# Patient Record
Sex: MALE | Race: WHITE | NOT HISPANIC OR LATINO | Employment: FULL TIME | URBAN - METROPOLITAN AREA
[De-identification: names, ages, dates, MRNs, and addresses within clinical notes are randomized per-mention and may not be internally consistent; named-entity substitution may affect disease eponyms.]

---

## 2020-11-04 ENCOUNTER — LAB (OUTPATIENT)
Dept: LAB | Facility: HOSPITAL | Age: 25
End: 2020-11-04

## 2020-11-04 ENCOUNTER — OFFICE VISIT (OUTPATIENT)
Dept: ENDOCRINOLOGY | Facility: CLINIC | Age: 25
End: 2020-11-04

## 2020-11-04 VITALS
HEART RATE: 65 BPM | BODY MASS INDEX: 27.86 KG/M2 | DIASTOLIC BLOOD PRESSURE: 72 MMHG | WEIGHT: 183.8 LBS | HEIGHT: 68 IN | OXYGEN SATURATION: 99 % | SYSTOLIC BLOOD PRESSURE: 110 MMHG

## 2020-11-04 DIAGNOSIS — E03.9 HYPOTHYROIDISM, UNSPECIFIED TYPE: Primary | ICD-10-CM

## 2020-11-04 DIAGNOSIS — E04.2 NONTOXIC MULTINODULAR GOITER: ICD-10-CM

## 2020-11-04 DIAGNOSIS — R79.89 LOW TESTOSTERONE: ICD-10-CM

## 2020-11-04 PROCEDURE — 84439 ASSAY OF FREE THYROXINE: CPT | Performed by: INTERNAL MEDICINE

## 2020-11-04 PROCEDURE — 99244 OFF/OP CNSLTJ NEW/EST MOD 40: CPT | Performed by: INTERNAL MEDICINE

## 2020-11-04 PROCEDURE — 84443 ASSAY THYROID STIM HORMONE: CPT | Performed by: INTERNAL MEDICINE

## 2020-11-04 PROCEDURE — 84403 ASSAY OF TOTAL TESTOSTERONE: CPT | Performed by: INTERNAL MEDICINE

## 2020-11-04 PROCEDURE — 84402 ASSAY OF FREE TESTOSTERONE: CPT | Performed by: INTERNAL MEDICINE

## 2020-11-04 RX ORDER — TESTOSTERONE CYPIONATE 200 MG/ML
175 INJECTION, SOLUTION INTRAMUSCULAR WEEKLY
COMMUNITY
Start: 2020-10-15 | End: 2021-02-08

## 2020-11-04 RX ORDER — DIVALPROEX SODIUM 125 MG/1
1 TABLET, DELAYED RELEASE ORAL 2 TIMES DAILY
COMMUNITY
Start: 2020-09-30 | End: 2021-02-08

## 2020-11-04 RX ORDER — LEVOTHYROXINE SODIUM 0.12 MG/1
1 TABLET ORAL DAILY
COMMUNITY
Start: 2020-09-28 | End: 2020-11-05

## 2020-11-04 RX ORDER — FLUOXETINE HYDROCHLORIDE 40 MG/1
80 CAPSULE ORAL DAILY
COMMUNITY
End: 2021-02-08

## 2020-11-04 RX ORDER — ANASTROZOLE 1 MG/1
1 TABLET ORAL EVERY OTHER DAY
COMMUNITY
End: 2021-02-08

## 2020-11-04 RX ORDER — ERGOCALCIFEROL 1.25 MG/1
50000 CAPSULE ORAL DAILY
COMMUNITY

## 2020-11-05 ENCOUNTER — TELEPHONE (OUTPATIENT)
Dept: ENDOCRINOLOGY | Facility: CLINIC | Age: 25
End: 2020-11-05

## 2020-11-05 LAB
T4 FREE SERPL-MCNC: 1.03 NG/DL (ref 0.93–1.7)
TSH SERPL DL<=0.05 MIU/L-ACNC: 4.69 UIU/ML (ref 0.27–4.2)

## 2020-11-05 RX ORDER — LEVOTHYROXINE SODIUM 137 UG/1
137 TABLET ORAL DAILY
Qty: 30 TABLET | Refills: 5 | Status: SHIPPED | OUTPATIENT
Start: 2020-11-05 | End: 2021-02-11 | Stop reason: SDUPTHER

## 2020-11-05 NOTE — TELEPHONE ENCOUNTER
Received thyroid US that we requested from [facility].  Forward records to Dr. Ruiz for review.

## 2020-11-05 NOTE — TELEPHONE ENCOUNTER
----- Message from Christen Ruiz MD sent at 11/4/2020  3:52 PM EST -----  Please contact office of [facility] and request most recent office note and all prior testosterone labs.

## 2020-11-11 LAB
TESTOST FREE MFR SERPL: 4.2 % (ref 1.5–4.2)
TESTOST FREE SERPL-MCNC: 19.61 NG/DL (ref 5–21)
TESTOST SERPL-MCNC: 466.9 NG/DL (ref 264–916)

## 2021-02-08 ENCOUNTER — LAB (OUTPATIENT)
Dept: LAB | Facility: HOSPITAL | Age: 26
End: 2021-02-08

## 2021-02-08 ENCOUNTER — OFFICE VISIT (OUTPATIENT)
Dept: ENDOCRINOLOGY | Facility: CLINIC | Age: 26
End: 2021-02-08

## 2021-02-08 VITALS
HEIGHT: 68 IN | DIASTOLIC BLOOD PRESSURE: 74 MMHG | OXYGEN SATURATION: 98 % | BODY MASS INDEX: 27.95 KG/M2 | HEART RATE: 85 BPM | SYSTOLIC BLOOD PRESSURE: 120 MMHG

## 2021-02-08 DIAGNOSIS — E04.0 SIMPLE GOITER: ICD-10-CM

## 2021-02-08 DIAGNOSIS — E55.9 VITAMIN D DEFICIENCY: ICD-10-CM

## 2021-02-08 DIAGNOSIS — E03.9 HYPOTHYROIDISM, UNSPECIFIED TYPE: Primary | ICD-10-CM

## 2021-02-08 DIAGNOSIS — R53.83 OTHER FATIGUE: ICD-10-CM

## 2021-02-08 LAB
25(OH)D3 SERPL-MCNC: 28.8 NG/ML (ref 30–100)
FSH SERPL-ACNC: 3.97 MIU/ML
LH SERPL-ACNC: 8.67 MIU/ML
T4 FREE SERPL-MCNC: 0.88 NG/DL (ref 0.93–1.7)
T4 SERPL-MCNC: 5.21 MCG/DL (ref 4.5–11.7)
TSH SERPL DL<=0.05 MIU/L-ACNC: 3.28 UIU/ML (ref 0.27–4.2)

## 2021-02-08 PROCEDURE — 83001 ASSAY OF GONADOTROPIN (FSH): CPT | Performed by: INTERNAL MEDICINE

## 2021-02-08 PROCEDURE — 84402 ASSAY OF FREE TESTOSTERONE: CPT | Performed by: INTERNAL MEDICINE

## 2021-02-08 PROCEDURE — 83002 ASSAY OF GONADOTROPIN (LH): CPT | Performed by: INTERNAL MEDICINE

## 2021-02-08 PROCEDURE — 84443 ASSAY THYROID STIM HORMONE: CPT | Performed by: INTERNAL MEDICINE

## 2021-02-08 PROCEDURE — 84403 ASSAY OF TOTAL TESTOSTERONE: CPT | Performed by: INTERNAL MEDICINE

## 2021-02-08 PROCEDURE — 99214 OFFICE O/P EST MOD 30 MIN: CPT | Performed by: INTERNAL MEDICINE

## 2021-02-08 PROCEDURE — 84436 ASSAY OF TOTAL THYROXINE: CPT | Performed by: INTERNAL MEDICINE

## 2021-02-08 PROCEDURE — 84439 ASSAY OF FREE THYROXINE: CPT | Performed by: INTERNAL MEDICINE

## 2021-02-08 PROCEDURE — 82306 VITAMIN D 25 HYDROXY: CPT | Performed by: INTERNAL MEDICINE

## 2021-02-08 RX ORDER — DIVALPROEX SODIUM 500 MG/1
1 TABLET, DELAYED RELEASE ORAL 2 TIMES DAILY
COMMUNITY
Start: 2021-02-02 | End: 2021-06-09

## 2021-02-08 NOTE — PROGRESS NOTES
"Chief Complaint   Patient presents with   • Follow-up   • Hypothyroidism        HPI   Daniel Mesa is a 25 y.o. male had concerns including Follow-up and Hypothyroidism.      Patient reports no significant health changes in the interim since his last visit.  He did discontinue testosterone therapy and Arimidex following his last visit.  He reports ongoing fatigue and some weight gain but denies any other new concerns.     Levothyroxine was increased to 137 mcg daily at last visit.  He has not had repeat labs since this dose increase.  He reports fatigue denies any changes in bowel habits, heat or cold intolerance.  He does report some interval weight gain.    Patient initially Diagnosed with hypogonadism after presenting for evaluation of fatigue.  Reviewed previous outside records revealed testosterone levels were normal prior to initiation of testosterone.  Discussed with patient that he may have some withdrawal effects given his previously high dose of testosterone take several months for his gonadal axis to recover.  Patient does report that he is going to have a sleep study to evaluate for sleep apnea.    Patient continues on high-dose vitamin D therapy.  He has not had follow-up labs.    Reviewed prior thyroid ultrasound which did not describe thyroid nodules.  Patient denies any difficulty breathing, difficulty swallowing, voice changes.    The following portions of the patient's history were reviewed and updated as appropriate: allergies, current medications and past social history.    Review of Systems   Constitutional: Positive for fatigue and unexpected weight gain.   HENT: Negative for trouble swallowing and voice change.    Cardiovascular: Negative for palpitations.   Gastrointestinal: Negative for abdominal pain, constipation and diarrhea.   Endocrine: Negative for cold intolerance and heat intolerance.      /74   Pulse 85   Ht 172.7 cm (68\")   SpO2 98%   BMI 27.95 kg/m²      Physical Exam "      Constitutional:  well developed; well nourished  no acute distress  appears stated age   ENT/Thyroid: no distinctly palpable nodules  enlarged   Eyes: Conjunctiva: clear   Respiratory:  breathing is unlabored  clear to auscultation bilaterally   Cardiovascular:  regular rate and rhythm   Chest:  Not performed.   Abdomen: soft, non-tender; no masses   : Not performed.   Musculoskeletal: Not performed   Skin: dry and warm   Neuro: mental status, speech normal   Psych: mood and affect are within normal limits       Labs/Imaging  Results for HARIS BOWDEN (MRN [id number]) as of 2/8/2021 11:24   Ref. Range 11/4/2020 11:02   Testosterone, Total Latest Ref Range: 264.0 - 916.0 ng/dL 466.9   Testosterone, Free Latest Ref Range: 5.00 - 21.00 ng/dL 19.61   Testosterone, Free % Latest Ref Range: 1.50 - 4.20 % 4.20   TSH Baseline Latest Ref Range: 0.270 - 4.200 uIU/mL 4.690 (H)   Free T4 Latest Ref Range: 0.93 - 1.70 ng/dL 1.03       Diagnoses and all orders for this visit:    1. Hypothyroidism, unspecified type (Primary)  -Patient currently taking levothyroxine 137 mcg daily, dose decreased given elevated TSH at last visit  -Patient with fatigue, weight gain  -Discussed with patient that his symptoms are nonspecific and may be multifactorial  -We will plan to repeat thyroid function testing today and adjust levothyroxine as clinically indicated  -     TSH  -     T4, Free    2. Vitamin D deficiency  -Patient taking 50,000 international units daily  -We will repeat vitamin D level to assess need for ongoing replacement  -     Vitamin D 25 Hydroxy    3. Other fatigue  -Patient reports he is going to be evaluated for sleep apnea  -We will repeat testosterone levels, thyroid function, vitamin D today  -Reviewed with patient that this symptom is likely multifactorial  -     TSH  -     T4, Free  -     Vitamin D 25 Hydroxy  -     Testosterone, F Eqlib & T LC / MS  -     Follicle Stimulating Hormone  -     Luteinizing  Hormone    4. Simple goiter  -No nodules noted on ultrasound report from August 2020  -No palpable nodules  -No compressive symptoms, compressive symptoms were reviewed and patient was encouraged to contact the office in the interim between visits with any concerning changes.      Return in about 4 months (around 6/8/2021) for Next scheduled follow up. The patient was instructed to contact the clinic with any interval questions or concerns.    Christen Ruiz MD   Endocrinologist    Please note that portions of this document were completed with a voice recognition program. Efforts were made to edit the dictations, but occasionally words are mis-transcribed.

## 2021-02-11 RX ORDER — LEVOTHYROXINE SODIUM 137 UG/1
137 TABLET ORAL DAILY
Qty: 30 TABLET | Refills: 5 | Status: SHIPPED | OUTPATIENT
Start: 2021-02-11 | End: 2021-06-10

## 2021-02-12 LAB
TESTOST FREE MFR SERPL: 3.72 % (ref 1.5–4.2)
TESTOST FREE SERPL-MCNC: 13.52 NG/DL (ref 5–21)
TESTOST SERPL-MCNC: 363.4 NG/DL (ref 264–916)

## 2021-06-09 ENCOUNTER — OFFICE VISIT (OUTPATIENT)
Dept: ENDOCRINOLOGY | Facility: CLINIC | Age: 26
End: 2021-06-09

## 2021-06-09 ENCOUNTER — LAB (OUTPATIENT)
Dept: LAB | Facility: HOSPITAL | Age: 26
End: 2021-06-09

## 2021-06-09 VITALS
HEIGHT: 68 IN | OXYGEN SATURATION: 99 % | HEART RATE: 87 BPM | WEIGHT: 194.2 LBS | SYSTOLIC BLOOD PRESSURE: 126 MMHG | DIASTOLIC BLOOD PRESSURE: 72 MMHG | BODY MASS INDEX: 29.43 KG/M2

## 2021-06-09 DIAGNOSIS — E55.9 VITAMIN D DEFICIENCY: ICD-10-CM

## 2021-06-09 DIAGNOSIS — R53.83 OTHER FATIGUE: ICD-10-CM

## 2021-06-09 DIAGNOSIS — E03.9 HYPOTHYROIDISM, UNSPECIFIED TYPE: Primary | ICD-10-CM

## 2021-06-09 LAB
25(OH)D3 SERPL-MCNC: 47.3 NG/ML
T4 FREE SERPL-MCNC: 1.04 NG/DL (ref 0.93–1.7)
TSH SERPL DL<=0.05 MIU/L-ACNC: 6.91 UIU/ML (ref 0.27–4.2)

## 2021-06-09 PROCEDURE — 84439 ASSAY OF FREE THYROXINE: CPT | Performed by: INTERNAL MEDICINE

## 2021-06-09 PROCEDURE — 84443 ASSAY THYROID STIM HORMONE: CPT | Performed by: INTERNAL MEDICINE

## 2021-06-09 PROCEDURE — 84403 ASSAY OF TOTAL TESTOSTERONE: CPT | Performed by: INTERNAL MEDICINE

## 2021-06-09 PROCEDURE — 99214 OFFICE O/P EST MOD 30 MIN: CPT | Performed by: INTERNAL MEDICINE

## 2021-06-09 PROCEDURE — 84402 ASSAY OF FREE TESTOSTERONE: CPT | Performed by: INTERNAL MEDICINE

## 2021-06-09 PROCEDURE — 82306 VITAMIN D 25 HYDROXY: CPT | Performed by: INTERNAL MEDICINE

## 2021-06-09 RX ORDER — PAROXETINE 10 MG/1
10 TABLET, FILM COATED ORAL NIGHTLY
COMMUNITY

## 2021-06-09 RX ORDER — CARIPRAZINE 1.5 MG/1
1 CAPSULE, GELATIN COATED ORAL DAILY
COMMUNITY
Start: 2021-04-14

## 2021-06-09 NOTE — PROGRESS NOTES
"Chief Complaint   Patient presents with   • Follow-up   • Hypothyroidism        HPI   Daniel Mesa is a 25 y.o. male had concerns including Follow-up and Hypothyroidism.     Patient reports he was diagnosed with CREST syndrome by his PCP in the interim since last visit after he presented with rash.  He has not seen a rheumatologist for this.  He reports ongoing concerns regarding fatigue which seem to have worsened in the interim since last visit.  He remains off testosterone therapy.  He continues on levothyroxine 137 µg daily and denies any missed doses of this medication.  Patient also continues on 50,000 units of vitamin D weekly for deficiency.  He denies any changes in bowel habits, significant weight changes.  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    The following portions of the patient's history were reviewed and updated as appropriate: allergies, current medications and past social history.    Review of Systems   Constitutional: Positive for fatigue. Negative for unexpected weight gain and unexpected weight loss.   Cardiovascular: Negative for palpitations.   Gastrointestinal: Negative for constipation and diarrhea.   Endocrine: Positive for cold intolerance and heat intolerance.      /72   Pulse 87   Ht 172.7 cm (68\")   Wt 88.1 kg (194 lb 3.2 oz)   SpO2 99%   BMI 29.53 kg/m²      Physical Exam      Constitutional:  well developed; well nourished  no acute distress  appears stated age   ENT/Thyroid: no thyromegaly   Eyes: Conjunctiva: clear   Respiratory:  breathing is unlabored  clear to auscultation bilaterally   Cardiovascular:  regular rate and rhythm   Chest:  Not performed.   Abdomen: soft, non-tender; no masses   : Not performed.   Musculoskeletal: Not performed   Skin: dry and warm   Neuro: mental status, speech normal   Psych: mood and affect are within normal limits       Labs/Imaging  Results " for AHRIS BOWDEN (MRN [id number]) as of 6/9/2021 09:48   Ref. Range 2/8/2021 09:50   LH Latest Units: mIU/mL 8.67   FSH Latest Units: mIU/mL 3.97   Testosterone, Total Latest Ref Range: 264.0 - 916.0 ng/dL 363.4   Testosterone, Free Latest Ref Range: 5.00 - 21.00 ng/dL 13.52   Testosterone, Free % Latest Ref Range: 1.50 - 4.20 % 3.72   TSH Baseline Latest Ref Range: 0.270 - 4.200 uIU/mL 3.280   T4, Total Latest Ref Range: 4.50 - 11.70 mcg/dL 5.21   Free T4 Latest Ref Range: 0.93 - 1.70 ng/dL 0.88 (L)   25 Hydroxy, Vitamin D Latest Ref Range: 30.0 - 100.0 ng/ml 28.8 (L)       Diagnoses and all orders for this visit:    1. Hypothyroidism, unspecified type (Primary)  -Currently taking levothyroxine 137 µg a day  -Patient reports fatigue, no other concerning symptoms  -We'll repeat thyroid function testing today and adjust as clinically indicated.  Discussed potential slight increase in TSH in the upper normal range, patient agreeable  -     TSH  -     T4, Free    2. Vitamin D deficiency  -Patient currently taking 50,000 international units weekly  -Repeat levels today and adjust as clinically indicated  -     Vitamin D 25 Hydroxy    3. Other fatigue  -Discussed with patient that this could be multifactorial, evaluating thyroid hormone vitamin D, as above  -Reviewed that testosterone levels were normal after discontinuation of therapy at last visit but will repeat given worsening symptoms  -Patient does report snoring, he has never been checked for sleep apnea.  Discussed that we may need to consider this if symptoms persist with an otherwise negative evaluation  -Encourage patient to schedule with rheumatology given recent diagnosis of CREST syndrome by PCP  -     Testosterone, F Eqlib & T LC / MS       Return in about 4 months (around 10/9/2021). The patient was instructed to contact the clinic with any interval questions or concerns.    Christen Ruiz MD   Endocrinologist    Please note that portions of this  document were completed with a voice recognition program. Efforts were made to edit the dictations, but occasionally words are mis-transcribed.

## 2021-06-10 RX ORDER — LEVOTHYROXINE SODIUM 0.15 MG/1
150 TABLET ORAL DAILY
Qty: 30 TABLET | Refills: 3 | Status: SHIPPED | OUTPATIENT
Start: 2021-06-10

## 2021-06-12 LAB
TESTOST FREE MFR SERPL: 4.89 % (ref 1.5–4.2)
TESTOST FREE SERPL-MCNC: 14.99 NG/DL (ref 5–21)
TESTOST SERPL-MCNC: 306.5 NG/DL (ref 264–916)